# Patient Record
Sex: MALE | Race: WHITE | NOT HISPANIC OR LATINO | Employment: OTHER | URBAN - METROPOLITAN AREA
[De-identification: names, ages, dates, MRNs, and addresses within clinical notes are randomized per-mention and may not be internally consistent; named-entity substitution may affect disease eponyms.]

---

## 2018-04-14 ENCOUNTER — APPOINTMENT (OUTPATIENT)
Dept: CT IMAGING | Facility: CLINIC | Age: 78
End: 2018-04-14
Attending: EMERGENCY MEDICINE
Payer: COMMERCIAL

## 2018-04-14 ENCOUNTER — HOSPITAL ENCOUNTER (OUTPATIENT)
Facility: CLINIC | Age: 78
Setting detail: OBSERVATION
Discharge: HOME | End: 2018-04-15
Attending: EMERGENCY MEDICINE | Admitting: SURGERY
Payer: COMMERCIAL

## 2018-04-14 DIAGNOSIS — R10.12 LUQ ABDOMINAL PAIN: ICD-10-CM

## 2018-04-14 DIAGNOSIS — S22.42XA CLOSED FRACTURE OF MULTIPLE RIBS OF LEFT SIDE, INITIAL ENCOUNTER: ICD-10-CM
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
## 2018-04-14 PROBLEM — S22.49XA FRACTURE, RIBS: Status: ACTIVE | Noted: 2018-04-14

## 2018-04-14 LAB
ALBUMIN SERPL-MCNC: 3.8 G/DL (ref 3.4–5)
ALBUMIN UR-MCNC: NEGATIVE MG/DL
ALP SERPL-CCNC: 91 U/L (ref 40–150)
ALT SERPL W P-5'-P-CCNC: 26 U/L (ref 0–70)
ANION GAP SERPL CALCULATED.3IONS-SCNC: 5 MMOL/L (ref 3–14)
APPEARANCE UR: CLEAR
APTT PPP: 30 SEC (ref 22–37)
AST SERPL W P-5'-P-CCNC: 23 U/L (ref 0–45)
BACTERIA #/AREA URNS HPF: ABNORMAL /HPF
BASOPHILS # BLD AUTO: 0.1 10E9/L (ref 0–0.2)
BASOPHILS NFR BLD AUTO: 0.5 %
BILIRUB SERPL-MCNC: 0.1 MG/DL (ref 0.2–1.3)
BILIRUB UR QL STRIP: NEGATIVE
BUN SERPL-MCNC: 18 MG/DL (ref 7–30)
CALCIUM SERPL-MCNC: 8.9 MG/DL (ref 8.5–10.1)
CHLORIDE SERPL-SCNC: 106 MMOL/L (ref 94–109)
CO2 SERPL-SCNC: 27 MMOL/L (ref 20–32)
COLOR UR AUTO: YELLOW
CREAT BLD-MCNC: 1 MG/DL (ref 0.66–1.25)
CREAT SERPL-MCNC: 0.93 MG/DL (ref 0.66–1.25)
DIFFERENTIAL METHOD BLD: ABNORMAL
EOSINOPHIL # BLD AUTO: 0.2 10E9/L (ref 0–0.7)
EOSINOPHIL NFR BLD AUTO: 1.7 %
ERYTHROCYTE [DISTWIDTH] IN BLOOD BY AUTOMATED COUNT: 14.5 % (ref 10–15)
GFR SERPL CREATININE-BSD FRML MDRD: 72 ML/MIN/1.7M2
GFR SERPL CREATININE-BSD FRML MDRD: 78 ML/MIN/1.7M2
GLUCOSE SERPL-MCNC: 108 MG/DL (ref 70–99)
GLUCOSE UR STRIP-MCNC: NEGATIVE MG/DL
HCT VFR BLD AUTO: 47.9 % (ref 40–53)
HGB BLD-MCNC: 15.8 G/DL (ref 13.3–17.7)
HGB UR QL STRIP: NEGATIVE
IMM GRANULOCYTES # BLD: 0 10E9/L (ref 0–0.4)
IMM GRANULOCYTES NFR BLD: 0.3 %
INR PPP: 0.91 (ref 0.86–1.14)
KETONES UR STRIP-MCNC: NEGATIVE MG/DL
LEUKOCYTE ESTERASE UR QL STRIP: NEGATIVE
LIPASE SERPL-CCNC: 156 U/L (ref 73–393)
LYMPHOCYTES # BLD AUTO: 2 10E9/L (ref 0.8–5.3)
LYMPHOCYTES NFR BLD AUTO: 17.1 %
MCH RBC QN AUTO: 31.4 PG (ref 26.5–33)
MCHC RBC AUTO-ENTMCNC: 33 G/DL (ref 31.5–36.5)
MCV RBC AUTO: 95 FL (ref 78–100)
MONOCYTES # BLD AUTO: 1 10E9/L (ref 0–1.3)
MONOCYTES NFR BLD AUTO: 8.5 %
NEUTROPHILS # BLD AUTO: 8.5 10E9/L (ref 1.6–8.3)
NEUTROPHILS NFR BLD AUTO: 71.9 %
NITRATE UR QL: NEGATIVE
NRBC # BLD AUTO: 0 10*3/UL
NRBC BLD AUTO-RTO: 0 /100
PH UR STRIP: 7 PH (ref 5–7)
PLATELET # BLD AUTO: 281 10E9/L (ref 150–450)
POTASSIUM SERPL-SCNC: 4.1 MMOL/L (ref 3.4–5.3)
PROT SERPL-MCNC: 7.4 G/DL (ref 6.8–8.8)
RBC # BLD AUTO: 5.03 10E12/L (ref 4.4–5.9)
RBC #/AREA URNS AUTO: 0 /HPF (ref 0–2)
SODIUM SERPL-SCNC: 138 MMOL/L (ref 133–144)
SOURCE: ABNORMAL
SP GR UR STRIP: 1.01 (ref 1–1.03)
UROBILINOGEN UR STRIP-MCNC: 0 MG/DL (ref 0–2)
WBC # BLD AUTO: 11.8 10E9/L (ref 4–11)
WBC #/AREA URNS AUTO: 7 /HPF (ref 0–5)

## 2018-04-14 PROCEDURE — 96374 THER/PROPH/DIAG INJ IV PUSH: CPT

## 2018-04-14 PROCEDURE — 85730 THROMBOPLASTIN TIME PARTIAL: CPT | Performed by: EMERGENCY MEDICINE

## 2018-04-14 PROCEDURE — 85610 PROTHROMBIN TIME: CPT | Performed by: EMERGENCY MEDICINE

## 2018-04-14 PROCEDURE — 25000128 H RX IP 250 OP 636: Performed by: EMERGENCY MEDICINE

## 2018-04-14 PROCEDURE — G0378 HOSPITAL OBSERVATION PER HR: HCPCS

## 2018-04-14 PROCEDURE — 80053 COMPREHEN METABOLIC PANEL: CPT | Performed by: EMERGENCY MEDICINE

## 2018-04-14 PROCEDURE — 83690 ASSAY OF LIPASE: CPT | Performed by: EMERGENCY MEDICINE

## 2018-04-14 PROCEDURE — 99203 OFFICE O/P NEW LOW 30 MIN: CPT | Performed by: SURGERY

## 2018-04-14 PROCEDURE — 96361 HYDRATE IV INFUSION ADD-ON: CPT

## 2018-04-14 PROCEDURE — 71260 CT THORAX DX C+: CPT

## 2018-04-14 PROCEDURE — 25000128 H RX IP 250 OP 636: Performed by: SURGERY

## 2018-04-14 PROCEDURE — 25000132 ZZH RX MED GY IP 250 OP 250 PS 637: Performed by: SURGERY

## 2018-04-14 PROCEDURE — 99285 EMERGENCY DEPT VISIT HI MDM: CPT | Mod: 25

## 2018-04-14 PROCEDURE — 85025 COMPLETE CBC W/AUTO DIFF WBC: CPT | Performed by: EMERGENCY MEDICINE

## 2018-04-14 PROCEDURE — 81001 URINALYSIS AUTO W/SCOPE: CPT | Performed by: EMERGENCY MEDICINE

## 2018-04-14 PROCEDURE — 96375 TX/PRO/DX INJ NEW DRUG ADDON: CPT

## 2018-04-14 PROCEDURE — 82565 ASSAY OF CREATININE: CPT | Mod: 91

## 2018-04-14 RX ORDER — SODIUM CHLORIDE 9 MG/ML
1000 INJECTION, SOLUTION INTRAVENOUS CONTINUOUS
Status: DISCONTINUED | OUTPATIENT
Start: 2018-04-14 | End: 2018-04-14 | Stop reason: CLARIF

## 2018-04-14 RX ORDER — POLYETHYLENE GLYCOL 3350 17 G/17G
17 POWDER, FOR SOLUTION ORAL DAILY PRN
Status: DISCONTINUED | OUTPATIENT
Start: 2018-04-14 | End: 2018-04-15 | Stop reason: HOSPADM

## 2018-04-14 RX ORDER — OXYCODONE HYDROCHLORIDE 5 MG/1
5 TABLET ORAL
Status: DISCONTINUED | OUTPATIENT
Start: 2018-04-14 | End: 2018-04-15 | Stop reason: HOSPADM

## 2018-04-14 RX ORDER — ONDANSETRON 2 MG/ML
4 INJECTION INTRAMUSCULAR; INTRAVENOUS EVERY 30 MIN PRN
Status: DISCONTINUED | OUTPATIENT
Start: 2018-04-14 | End: 2018-04-14 | Stop reason: CLARIF

## 2018-04-14 RX ORDER — IBUPROFEN 600 MG/1
600 TABLET, FILM COATED ORAL EVERY 6 HOURS PRN
Status: DISCONTINUED | OUTPATIENT
Start: 2018-04-14 | End: 2018-04-15 | Stop reason: HOSPADM

## 2018-04-14 RX ORDER — ONDANSETRON 4 MG/1
4 TABLET, ORALLY DISINTEGRATING ORAL EVERY 6 HOURS PRN
Status: DISCONTINUED | OUTPATIENT
Start: 2018-04-14 | End: 2018-04-15 | Stop reason: HOSPADM

## 2018-04-14 RX ORDER — IOPAMIDOL 755 MG/ML
500 INJECTION, SOLUTION INTRAVASCULAR ONCE
Status: COMPLETED | OUTPATIENT
Start: 2018-04-14 | End: 2018-04-14

## 2018-04-14 RX ORDER — SODIUM CHLORIDE, SODIUM LACTATE, POTASSIUM CHLORIDE, CALCIUM CHLORIDE 600; 310; 30; 20 MG/100ML; MG/100ML; MG/100ML; MG/100ML
1000 INJECTION, SOLUTION INTRAVENOUS CONTINUOUS
Status: DISCONTINUED | OUTPATIENT
Start: 2018-04-14 | End: 2018-04-15 | Stop reason: HOSPADM

## 2018-04-14 RX ORDER — NALOXONE HYDROCHLORIDE 0.4 MG/ML
.1-.4 INJECTION, SOLUTION INTRAMUSCULAR; INTRAVENOUS; SUBCUTANEOUS
Status: DISCONTINUED | OUTPATIENT
Start: 2018-04-14 | End: 2018-04-15 | Stop reason: HOSPADM

## 2018-04-14 RX ORDER — MORPHINE SULFATE 4 MG/ML
4 INJECTION, SOLUTION INTRAMUSCULAR; INTRAVENOUS
Status: DISCONTINUED | OUTPATIENT
Start: 2018-04-14 | End: 2018-04-14

## 2018-04-14 RX ORDER — ONDANSETRON 2 MG/ML
4 INJECTION INTRAMUSCULAR; INTRAVENOUS EVERY 6 HOURS PRN
Status: DISCONTINUED | OUTPATIENT
Start: 2018-04-14 | End: 2018-04-15 | Stop reason: HOSPADM

## 2018-04-14 RX ORDER — LIDOCAINE 4 G/G
1 PATCH TOPICAL
Status: DISCONTINUED | OUTPATIENT
Start: 2018-04-14 | End: 2018-04-14

## 2018-04-14 RX ORDER — LIDOCAINE 40 MG/G
CREAM TOPICAL
Status: DISCONTINUED | OUTPATIENT
Start: 2018-04-14 | End: 2018-04-15 | Stop reason: HOSPADM

## 2018-04-14 RX ORDER — HYDROMORPHONE HYDROCHLORIDE 1 MG/ML
.3-.5 INJECTION, SOLUTION INTRAMUSCULAR; INTRAVENOUS; SUBCUTANEOUS
Status: DISCONTINUED | OUTPATIENT
Start: 2018-04-14 | End: 2018-04-15 | Stop reason: HOSPADM

## 2018-04-14 RX ADMIN — OXYCODONE HYDROCHLORIDE 5 MG: 5 TABLET ORAL at 17:20

## 2018-04-14 RX ADMIN — IBUPROFEN 600 MG: 600 TABLET ORAL at 08:06

## 2018-04-14 RX ADMIN — SODIUM CHLORIDE 60 ML: 9 INJECTION, SOLUTION INTRAVENOUS at 01:45

## 2018-04-14 RX ADMIN — ONDANSETRON 4 MG: 2 INJECTION INTRAMUSCULAR; INTRAVENOUS at 01:28

## 2018-04-14 RX ADMIN — SODIUM CHLORIDE, POTASSIUM CHLORIDE, SODIUM LACTATE AND CALCIUM CHLORIDE 1000 ML: 600; 310; 30; 20 INJECTION, SOLUTION INTRAVENOUS at 05:49

## 2018-04-14 RX ADMIN — HYDROMORPHONE HYDROCHLORIDE 0.5 MG: 1 INJECTION, SOLUTION INTRAMUSCULAR; INTRAVENOUS; SUBCUTANEOUS at 05:52

## 2018-04-14 RX ADMIN — IOPAMIDOL 81 ML: 755 INJECTION, SOLUTION INTRAVENOUS at 01:45

## 2018-04-14 RX ADMIN — OXYCODONE HYDROCHLORIDE 5 MG: 5 TABLET ORAL at 08:06

## 2018-04-14 RX ADMIN — IBUPROFEN 600 MG: 600 TABLET ORAL at 18:11

## 2018-04-14 RX ADMIN — SODIUM CHLORIDE 1000 ML: 9 INJECTION, SOLUTION INTRAVENOUS at 01:25

## 2018-04-14 RX ADMIN — MORPHINE SULFATE 4 MG: 4 INJECTION INTRAVENOUS at 03:01

## 2018-04-14 RX ADMIN — LIDOCAINE 1 PATCH: 560 PATCH PERCUTANEOUS; TOPICAL; TRANSDERMAL at 08:01

## 2018-04-14 RX ADMIN — OXYCODONE HYDROCHLORIDE 5 MG: 5 TABLET ORAL at 12:15

## 2018-04-14 RX ADMIN — POLYETHYLENE GLYCOL 3350 17 G: 17 POWDER, FOR SOLUTION ORAL at 18:12

## 2018-04-14 RX ADMIN — SODIUM CHLORIDE 1000 ML: 9 INJECTION, SOLUTION INTRAVENOUS at 02:57

## 2018-04-14 ASSESSMENT — ENCOUNTER SYMPTOMS
HEADACHES: 0
ARTHRALGIAS: 0
ABDOMINAL PAIN: 1
WOUND: 0
JOINT SWELLING: 0
COUGH: 0
VOMITING: 0

## 2018-04-14 NOTE — PLAN OF CARE
Problem: Patient Care Overview  Goal: Plan of Care/Patient Progress Review  PRIMARY DIAGNOSIS: Fall   OUTPATIENT/OBSERVATION GOALS TO BE MET BEFORE DISCHARGE:  1. ADLs back to baseline: Yes    2. Activity and level of assistance: Up with standby assistance.    3. Pain status: IV narcotics    4. Return to near baseline physical activity: No     Discharge Planner Nurse   Safe discharge environment identified: Yes  Barriers to discharge: No       Entered by: Felisa Younger 04/14/2018 3:57 AM     Please review provider order for any additional goals.   Nurse to notify provider when observation goals have been met and patient is ready for discharge.    Pt arrived to floor at 0330. A/O x4, c/o 6/10 pain to left side, no bruising present. Pt stated that he fell during work on Thursday afternoon and could not take the pain anymore and decided to come in. CT shows 10-11 rib fracture, UA neg. Will continue to monitor.

## 2018-04-14 NOTE — PLAN OF CARE
Problem: Patient Care Overview  Goal: Plan of Care/Patient Progress Review  ROOM # 221    Living Situation (if not independent, order SW consult): Independent  Facility name:  : Mendel Mcdaniel (son) [phone]    Activity level at baseline: Ind   Activity level on admit: SBA      Patient registered to observation; given Patient Bill of Rights; given the opportunity to ask questions about observation status and their plan of care.  Patient has been oriented to the observation room, bathroom and call light is in place.    Discussed discharge goals and expectations with patient/family.

## 2018-04-14 NOTE — H&P
"[facility]     Shivam Mcdaniel MRN# [id number]   Age: 77 year old YOB: 1940     Date of Admission:  4/14/2018                                Assessment and Plan:   Assessment:   -  Patient Active Problem List    Diagnosis Date Noted     Fracture, ribs 04/14/2018     Priority: Medium         Plan:   Pain management, transition to oral when able  No surgery indicated  Bowel management  DVT prophylaxis            Chief Complaint:   Fall, rib fractures     History is obtained from the patient and electronic health record         History of Present Illness:   This patient is a 77 year old  male without a significant past medical history who presents with the following condition requiring a hospital admission: fall with rib fractures. He was clearing property when he slipped and fell onto a 4\" diameter stump 4 PM two days ago. Since then he has had persistent pain and came to the ER for this. He denies any other injury or symptoms.           Past Medical History:   History reviewed. No pertinent past medical history.          Past Surgical History:   History reviewed. No pertinent surgical history.          Social History:     Social History   Substance Use Topics     Smoking status: Current Every Day Smoker     Smokeless tobacco: Never Used     Alcohol use Yes             Family History:   No family history on file.          Immunizations:     VACCINE/DOSE   Diptheria   DPT   DTAP   HBIG   Hepatitis A   Hepatitis B   HIB   Influenza   Measles   Meningococcal   MMR   Mumps   Pneumococcal   Polio   Rubella   Small Pox   TDAP   Varicella   Zoster             Allergies:   No Known Allergies          Medications:     Current Facility-Administered Medications   Medication     sodium chloride 0.9% infusion     ondansetron (ZOFRAN) injection 4 mg     naloxone (NARCAN) injection 0.1-0.4 mg     lidocaine 1 % 1 mL     lidocaine (LMX4) kit     sodium chloride (PF) 0.9% PF flush 3 mL     " "lactated ringers infusion     ibuprofen (ADVIL/MOTRIN) tablet 600 mg     Lidocaine (LIDOCARE) 4 % Patch 1 patch     lidocaine patch REMOVAL     lidocaine patch in PLACE     HYDROmorphone (PF) (DILAUDID) injection 0.3-0.5 mg     oxyCODONE IR (ROXICODONE) tablet 5 mg     polyethylene glycol (MIRALAX/GLYCOLAX) Packet 17 g     ondansetron (ZOFRAN-ODT) ODT tab 4 mg    Or     ondansetron (ZOFRAN) injection 4 mg             Review of Systems:   C: NEGATIVE for fever, chills, change in weight  E/M: NEGATIVE for ear, mouth and throat problems  R: NEGATIVE for significant cough or SOB          Physical Exam:   All vitals have been reviewed  Patient Vitals for the past 24 hrs:   BP Temp Temp src Pulse Heart Rate Resp SpO2 Height Weight   04/14/18 0340 131/87 97.9  F (36.6  C) Oral - 74 20 97 % - -   04/14/18 0315 (!) 144/92 - - - - - 95 % - -   04/14/18 0300 133/73 - - - 66 - 96 % - -   04/14/18 0245 125/82 - - - 69 12 96 % - -   04/14/18 0230 133/71 - - - - 13 97 % - -   04/14/18 0215 128/72 - - - 67 8 97 % - -   04/14/18 0200 125/75 - - - - - - - -   04/14/18 0134 127/74 - - - 76 (!) 46 90 % - -   04/14/18 0130 - - - - 75 (!) 82 93 % - -   04/14/18 0128 - - - - 73 30 93 % - -   04/14/18 0124 - - - - 73 25 94 % - -   04/14/18 0117 146/85 98.9  F (37.2  C) Oral - - 22 - 1.702 m (5' 7\") -   04/14/18 0114 146/85 - - 78 78 18 95 % - 72.6 kg (160 lb)     No intake or output data in the 24 hours ending 04/14/18 0754  HEENT AT/NC, GCS - 15 E-4  V-5  M-6, full ROM without pain, nl occlusion per pt, no nasal trauma apparent, voice normal per pt  Neck:   supple, symmetrical, trachea midline, skin normal and no stridor     Chest / Breast:   Nl resp effort, pain in Left lower chest at injury site     Abdomen:   soft, non-distended, non-tender, voluntary guarding absent and no masses palpated     Musculoskeletal:   There is no redness, warmth, or swelling of the joints.  Full range of motion noted.  Motor strength is 5 out of 5 all " extremities bilaterally.  Tone is normal.             Data:   All laboratory data reviewed  Results for orders placed or performed during the hospital encounter of 04/14/18   CT Chest/Abdomen/Pelvis w Contrast    Narrative    CT CHEST/ABDOMEN/PELVIS W CONTRAST  4/14/2018 1:50 AM    HISTORY: One day status post falling on 4 inch tree stump. Evaluate  trauma.    TECHNIQUE: CT scan obtained of the chest, abdomen, and pelvis with  oral and IV contrast. 81 mL Isovue-370 injected. Radiation dose for  this scan was reduced using automated exposure control, adjustment of  the mA and/or kV according to patient size, or iterative  reconstruction technique.    COMPARISON: None.    FINDINGS:  Chest: There is dependent atelectasis bilaterally. No pneumothorax or  pleural effusion. The thoracic aorta is calcified and tortuous. There  are coronary artery atherosclerotic calcifications. The heart size is  normal. No mediastinal, hilar or axillary lymph node enlargement.    Abdomen: There are a few hepatic cysts and other tiny low attenuation  lesions. Low attenuation subcentimeter liver lesion(s) compatible with  benign cysts or other benign lesions. No specific evaluation or  follow-up is recommended in a low risk patient. The spleen,  gallbladder, pancreas, adrenal glands and left kidney are normal in  appearance. A few tiny right renal cortical lesions. 1.8 cm cyst in  the upper pole of the right kidney. Low attenuation subcentimeter  renal lesion(s). These are compatible with small benign cysts and no  specific imaging evaluation or follow-up is recommended. There is no  abdominal or pelvic lymph node enlargement. There is atherosclerotic  calcification of the aorta and its branches. No aneurysm.    Pelvis: There are sigmoid diverticula without acute diverticulitis. No  bowel obstruction or inflammation. The prostate gland is enlarged and  there is a TURP defect. There are postoperative changes in the  anterior pelvic wall.  There are nondisplaced fractures of the left  lateral tenth and eleventh ribs. No other acute bone fracture.      Impression    IMPRESSION:  1. Left lateral tenth and eleventh rib fractures.  2. No other acute traumatic abnormality.  3. Coronary artery atherosclerotic calcifications.  4. Colonic diverticula without acute diverticulitis.    RAJWINDER MADRIGAL MD   CBC with platelets differential   Result Value Ref Range    WBC 11.8 (H) 4.0 - 11.0 10e9/L    RBC Count 5.03 4.4 - 5.9 10e12/L    Hemoglobin 15.8 13.3 - 17.7 g/dL    Hematocrit 47.9 40.0 - 53.0 %    MCV 95 78 - 100 fl    MCH 31.4 26.5 - 33.0 pg    MCHC 33.0 31.5 - 36.5 g/dL    RDW 14.5 10.0 - 15.0 %    Platelet Count 281 150 - 450 10e9/L    Diff Method Automated Method     % Neutrophils 71.9 %    % Lymphocytes 17.1 %    % Monocytes 8.5 %    % Eosinophils 1.7 %    % Basophils 0.5 %    % Immature Granulocytes 0.3 %    Nucleated RBCs 0 0 /100    Absolute Neutrophil 8.5 (H) 1.6 - 8.3 10e9/L    Absolute Lymphocytes 2.0 0.8 - 5.3 10e9/L    Absolute Monocytes 1.0 0.0 - 1.3 10e9/L    Absolute Eosinophils 0.2 0.0 - 0.7 10e9/L    Absolute Basophils 0.1 0.0 - 0.2 10e9/L    Abs Immature Granulocytes 0.0 0 - 0.4 10e9/L    Absolute Nucleated RBC 0.0    INR   Result Value Ref Range    INR 0.91 0.86 - 1.14   Partial thromboplastin time   Result Value Ref Range    PTT 30 22 - 37 sec   Comprehensive metabolic panel   Result Value Ref Range    Sodium 138 133 - 144 mmol/L    Potassium 4.1 3.4 - 5.3 mmol/L    Chloride 106 94 - 109 mmol/L    Carbon Dioxide 27 20 - 32 mmol/L    Anion Gap 5 3 - 14 mmol/L    Glucose 108 (H) 70 - 99 mg/dL    Urea Nitrogen 18 7 - 30 mg/dL    Creatinine 0.93 0.66 - 1.25 mg/dL    GFR Estimate 78 >60 mL/min/1.7m2    GFR Estimate If Black >90 >60 mL/min/1.7m2    Calcium 8.9 8.5 - 10.1 mg/dL    Bilirubin Total 0.1 (L) 0.2 - 1.3 mg/dL    Albumin 3.8 3.4 - 5.0 g/dL    Protein Total 7.4 6.8 - 8.8 g/dL    Alkaline Phosphatase 91 40 - 150 U/L    ALT 26 0 - 70 U/L     AST 23 0 - 45 U/L   Lipase   Result Value Ref Range    Lipase 156 73 - 393 U/L   UA with Microscopic   Result Value Ref Range    Color Urine Yellow     Appearance Urine Clear     Glucose Urine Negative NEG^Negative mg/dL    Bilirubin Urine Negative NEG^Negative    Ketones Urine Negative NEG^Negative mg/dL    Specific Gravity Urine 1.010 1.003 - 1.035    Blood Urine Negative NEG^Negative    pH Urine 7.0 5.0 - 7.0 pH    Protein Albumin Urine Negative NEG^Negative mg/dL    Urobilinogen mg/dL 0.0 0.0 - 2.0 mg/dL    Nitrite Urine Negative NEG^Negative    Leukocyte Esterase Urine Negative NEG^Negative    Source Midstream Urine     WBC Urine 7 (H) 0 - 5 /HPF    RBC Urine 0 0 - 2 /HPF    Bacteria Urine Few (A) NEG^Negative /HPF   Creatinine POCT   Result Value Ref Range    Creatinine 1.0 0.66 - 1.25 mg/dL    GFR Estimate 72 >60 mL/min/1.7m2    GFR Estimate If Black 88 >60 mL/min/1.7m2     -     Attestation:  I have reviewed today's vital signs, notes, medications, labs and imaging.  Amount of time performed on this consult: 45 minutes.    Joce Langston MD

## 2018-04-14 NOTE — ED NOTES
Patient has no obvious sign of bruising on Left abdomen/rib/side where patient came into contact with tree stump. Unable to take deep breath without 10/10 pain, tender to the slightest touch

## 2018-04-14 NOTE — ED NOTES
"[facility]  ED Nurse Handoff Report    Shivam Mcdaniel is a 77 year old male   ED Chief complaint: Fall  . ED Diagnosis:   Final diagnoses:   Fall, initial encounter   LUQ abdominal pain   Closed fracture of multiple ribs of left side, initial encounter     Allergies: No Known Allergies    Code Status: Full Code  Activity level - Baseline/Home:  Independent. Activity Level - Current:   Stand with Assist. Lift room needed: No. Bariatric: No   Needed: No   Isolation: No. Infection: Not Applicable.     Vital Signs:   Vitals:    04/14/18 0200 04/14/18 0215 04/14/18 0230 04/14/18 0245   BP: 125/75 128/72 133/71 125/82   Pulse:       Resp:  8 13 12   Temp:       TempSrc:       SpO2:  97% 97% 96%   Weight:       Height:           Cardiac Rhythm:  ,      Pain level: 0-10 Pain Scale: 6  Patient confused: No. Patient Falls Risk: Yes.   Elimination Status: Has voided   Patient Report - Initial Complaint: Fall with Left abdomen/rib pain after landing on a 4\"diameter tree stump Thursday. Focused Assessment: Left lung diminished but clear. Severe Left rib/Upper abdomen pain with light tough or movement   Tests Performed: Labs, CT. Abnormal Results: See results, Left 10/11th rib fracture.   Treatments provided: IV, PAIN meds  Family Comments: Son present and agreeable to admit  OBS brochure/video discussed/provided to patient:  Yes  ED Medications:   Medications   0.9% sodium chloride BOLUS (0 mLs Intravenous Stopped 4/14/18 0257)     Followed by   sodium chloride 0.9% infusion (1,000 mLs Intravenous New Bag 4/14/18 0257)   morphine (PF) injection 4 mg (4 mg Intravenous Given 4/14/18 0301)   ondansetron (ZOFRAN) injection 4 mg (4 mg Intravenous Given 4/14/18 0128)   0.9% sodium chloride BOLUS (0 mLs Intravenous Stopped 4/14/18 0148)   iopamidol (ISOVUE-370) solution 500 mL (81 mLs Intravenous Given 4/14/18 0145)     Drips infusing:  No  For the majority of the shift, the patient's behavior Green. " Interventions performed were N/A.     Severe Sepsis OR Septic Shock Diagnosis Present: No      ED Nurse Name/Phone Number: Kylie Yap,   3:06 AM    RECEIVING UNIT ED HANDOFF REVIEW    Above ED Nurse Handoff Report was reviewed: Yes  Reviewed by: Felisa Younger on April 14, 2018 at 3:11 AM

## 2018-04-14 NOTE — PLAN OF CARE
Problem: Patient Care Overview  Goal: Plan of Care/Patient Progress Review  PRIMARY DIAGNOSIS: ACUTE PAIN/RIB FRACTURE  OUTPATIENT/OBSERVATION GOALS TO BE MET BEFORE DISCHARGE:  1. Pain Status: Improved-controlled with oral pain medications.    2. Return to near baseline physical activity: Yes    3. Cleared for discharge by consultants (if involved): N/A    Discharge Planner Nurse   Safe discharge environment identified: Yes  Barriers to discharge: No       Entered by: Leonor Man 04/14/2018 5:14 PM     Please review provider order for any additional goals.   Nurse to notify provider when observation goals have been met and patient is ready for discharge.

## 2018-04-14 NOTE — ED NOTES
Pt reports falling onto 4 inch diameter stump and striking right abd Thursday at 4 pm. Continued pain denies any bleeding, no noted deformities or bruising

## 2018-04-14 NOTE — PHARMACY-ADMISSION MEDICATION HISTORY
Admission medication history interview status for this patient is complete. See [facility] admission navigator for allergy information, prior to admission medications and immunization status.     Medication history interview source(s):Patient  Medication history resources (including written lists, pill bottles, clinic record):None  Primary pharmacy[facility]    Changes made to PTA medication list:  Added: none  Deleted: none  Changed: none    Actions taken by pharmacist (provider contacted, etc):None     Additional medication history information:None    Medication reconciliation/reorder completed by provider prior to medication history? No    Do you take OTC medications (eg tylenol, ibuprofen, fish oil, eye/ear drops, etc)? N      Prior to Admission medications    Not on File

## 2018-04-14 NOTE — IP AVS SNAPSHOT
[facility] Observation Department    [address]    [facility] [address]    Phone:  [phone]                                       After Visit Summary   4/14/2018    Shivam Mcdaniel    MRN: [id number]           After Visit Summary Signature Page     I have received my discharge instructions, and my questions have been answered. I have discussed any challenges I see with this plan with the nurse or doctor.    ..........................................................................................................................................  Patient/Patient Representative Signature      ..........................................................................................................................................  Patient Representative Print Name and Relationship to Patient    ..................................................               ................................................  Date                                            Time    ..........................................................................................................................................  Reviewed by Signature/Title    ...................................................              ..............................................  Date                                                            Time

## 2018-04-14 NOTE — IP AVS SNAPSHOT
MRN:[id number]                      After Visit Summary   4/14/2018    Shivam Mcdaniel    MRN: [id number]           Thank you!     Thank you for choosing [facility] for your care. Our goal is always to provide you with excellent care. Hearing back from our patients is one way we can continue to improve our services. Please take a few minutes to complete the written survey that you may receive in the mail after you visit. If you would like to speak to someone directly about your visit please contact Patient Relations at [phone]. Thank you!          Patient Information     Date Of Birth          1940        About your hospital stay     You were admitted on:  April 14, 2018 You last received care in the:  [facility] Observation Department    You were discharged on:  April 15, 2018       Who to Call     For medical emergencies, please call 911.  For non-urgent questions about your medical care, please call your primary care provider or clinic, None          Attending Provider     Provider Specialty    Zachary Cruz MD Emergency Medicine    Yuridia Avina MD General Surgery       Primary Care Provider Fax #    Provider Not In System [phone]                 Further instructions from your care team       HOME CARE   BATSHEVA Rasmussen, NATHANIEL Avina, JOHN Langston, RDaniele Shaw    DIET:  No restrictions.  Increased fluid intake is recommended. While taking pain medications, increase dietary fiber or add a fiber supplementation like Metamucil or Citrucel to help prevent constipation - a possible side effect of pain medications.    NAUSEA:  If nauseated from the pain meds; rest in bed, get up cautiously with assistance, and drink clear liquids (juice, tea, broth).    ACTIVITY:  Light Activity -- you may immediately be up and about as tolerated.  Driving -- you may drive when comfortable and off narcotic pain medications.  Light Work -- resume  when comfortable off pain medications.  (If you can drive, you probably can work.)  Strenuous Work/Activity -- limit lifting to 20 pounds for 3 weeks.  Active Sports (running, biking, etc.) -- cautiously resume after 4 weeks.      DISCOMFORT:   Take the pain medication with some food, when possible, to minimize side effects.  Intermittent use of ice packs may help during the first 1-3 weeks.  Expect gradual improvement.    Over-the-counter anti-inflammatory medications (i.e. Ibuprofen/Advil/Motrin or Naprosyn/Aleve) may be used per package instructions in addition to or while tapering off the narcotic pain medications to decrease swelling and sensitivity at the repair site.  DO NOT TAKE these Anti-inflammatory medications if your primary physician has advised against doing so, or if you have acid reflux, ulcer, or bleeding disorder, or take blood-thinner medications.  Call your primary physician or the surgery office if you have medication questions.      FREQUENTLY ASKED QUESTIONS:    Q:  There is a piece of tape or a sticky  lead  still on my skin.  Can I remove this?  A:  Sometimes the sticky  leads  used for monitoring during surgery or for evaluation in the emergency department are not all removed while you are in the hospital.  These sometimes have a tab or metal dot on them.  You can easily remove these on your own, like taking off a band-aid.  If there is a gel substance under the  lead , simply wipe/clean it off with a washcloth or paper towel.      Q:  What can I do to minimize constipation (very hard stools, or lack of stools)?  A:  Stay well hydrated.  Increase your dietary fiber intake or take a fiber supplement -with plenty of water.  Walk around frequently.  You may consider an over-the-counter stool-softener.  Your Pharmacist can assist you with choosing one that is stocked at your pharmacy.  Constipation is also one of the most common side effects of pain medication.  If you are using pain  medication, be pro-active and try to PREVENT problems with constipation by taking the steps above BEFORE constipation becomes a problem.    Q:  What do I do if I need more pain medications?  A:  Call the office to receive refills.  Be aware that certain pain meds cannot be called into a pharmacy and actually require a paper prescription.  A change may be made in your pain med as you progress thru your recovery period or if you have side effects to certain meds.    --Pain meds are NOT refilled after 5pm on weekdays, and NOT AT ALL on the weekends, so please look ahead to prevent problems.      Q:  Why am I having a hard time sleeping now that I am at home?  A:  Many medications you receive while you are in the hospital can impact your sleep for a number of days after your surgery/hospitalization.  Decreased level of activity and naps during the day may also make sleeping at night difficult.  Try to minimize day-time naps, and get up frequently during the day to walk around your home during your recovery time.  Sleep aides may be of some help, but are not recommended for long-term use.      Q:  Why am I having headaches?  A:  Headaches can be caused by many things:  caffeine withdrawal, use of pain meds, dehydration, high blood pressure, lack of sleep, over-activity/exhaustion, flare-up of usual migraine headaches.  If you feel this is related to muscle tension (a band-like feeling around the head, or a pressure at the low-back of the head) you may try ice or heat to this area.  You may need to drink more fluids (try electrolyte drink like Gatorade), rest, or take your usual migraine medications.   **If your headaches do not resolve, worsen, are accompanied by other symptoms, or if your blood pressure is high, please call your primary physician for recommendation and/or examination    If you have other questions, please call the office Monday thru Friday between 8am and 5pm to discuss with the nurse or physician  "assistant.  #[phone]    There is a surgeon ON CALL on weekday evenings and over the weekend in case of urgent need only, and may be contacted at the same number.    If you are having an emergency, call 911 or proceed to your nearest emergency department.          Pending Results     No orders found for last 3 day(s).            Admission Information     Date & Time Provider Department Dept. Phone    2018 Yuridia Avina MD [facility] Observ[facility] Department [phone]      Your Vitals Were     Blood Pressure Pulse Temperature Respirations Height Weight    132/69 (BP Location: Left arm) 71 97.6  F (36.4  C) (Oral) 16 1.702 m (5' 7\") 72.6 kg (160 lb)    Pulse Oximetry BMI (Body Mass Index)                95% 25.06 kg/m2          MyCharSnipSnap Information     HelloNature lets you send messages to your doctor, view your test results, renew your prescriptions, schedule appointments and more. To sign up, go to [email or web address] . Click on \"Log in\" on the left side of the screen, which will take you to the Welcome page. Then click on \"Sign up Now\" on the right side of the page.     You will be asked to enter the access code listed below, as well as some personal information. Please follow the directions to create your username and password.     Your access code is: [email or web address]  Expires: 2018  7:29 AM     Your access code will  in 90 days. If you need help or a new code, please call your [place] clinic or [phone].        Care EveryWhere ID     This is your Care EveryWhere ID. This could be used by other organizations to access your [place] medical records  [id number]        Equal Access to Services     LOIDA GAYLE : Cami Morgan, tan kelly, cara yao, michael marx. So [facility] [phone].    ATENCIÓN: Si habla español, tiene a mcgarry disposición servicios gratuitos de asistencia lingüística. Llame al " [phone].    We comply with applicable federal civil rights laws and [place] laws. We do not discriminate on the basis of race, color, national origin, age, disability, sex, sexual orientation, or gender identity.               Review of your medicines      START taking        Dose / Directions    oxyCODONE IR 5 MG tablet   Commonly known as:  ROXICODONE   Used for:  Fall, initial encounter        Dose:  5 mg   Take 1 tablet (5 mg) by mouth every 3 hours as needed for moderate to severe pain   Quantity:  30 tablet   Refills:  0            Where to get your medicines      Some of these will need a paper prescription and others can be bought over the counter. Ask your nurse if you have questions.     Bring a paper prescription for each of these medications     oxyCODONE IR 5 MG tablet                Protect others around you: Learn how to safely use, store and throw away your medicines at www.disposemymeds.org.        Information about OPIOIDS     PRESCRIPTION OPIOIDS: WHAT YOU NEED TO KNOW    Prescription opioids can be used to help relieve moderate to severe pain and are often prescribed following a surgery or injury, or for certain health conditions. These medications can be an important part of treatment but also come with serious risks. It is important to work with your health care provider to make sure you are getting the safest, most effective care.    WHAT ARE THE RISKS AND SIDE EFFECTS OF OPIOID USE?  Prescription opioids carry serious risks of addiction and overdose, especially with prolonged use. An opioid overdose, often marked by slowed breathing can cause sudden death. The use of prescription opioids can have a number of side effects as well, even when taken as directed:      Tolerance - meaning you might need to take more of a medication for the same pain relief    Physical dependence - meaning you have symptoms of withdrawal when a medication is stopped    Increased sensitivity to  pain    Constipation    Nausea, vomiting, and dry mouth    Sleepiness and dizziness    Confusion    Depression    Low levels of testosterone that can result in lower sex drive, energy, and strength    Itching and sweating    RISKS ARE GREATER WITH:    History of drug misuse, substance use disorder, or overdose    Mental health conditions (such as depression or anxiety)    Sleep apnea    Older age (65 years or older)    Pregnancy    Avoid alcohol while taking prescription opioids.   Also, unless specifically advised by your health care provider, medications to avoid include:    Benzodiazepines (such as Xanax or Valium)    Muscle relaxants (such as Soma or Flexeril)    Hypnotics (such as Ambien or Lunesta)    Other prescription opioids    KNOW YOUR OPTIONS:  Talk to your health care provider about ways to manage your pain that do not involve prescription opioids. Some of these options may actually work better and have fewer risks and side effects:    Pain relievers such as acetaminophen, ibuprofen, and naproxen    Some medications that are also used for depression or seizures    Physical therapy and exercise    Cognitive behavioral therapy, a psychological, goal-directed approach, in which patients learn how to modify physical, behavioral, and emotional triggers of pain and stress    IF YOU ARE PRESCRIBED OPIOIDS FOR PAIN:    Never take opioids in greater amounts or more often than prescribed    Follow up with your primary health care provider and work together to create a plan on how to manage your pain.    Talk about ways to help manage your pain that do not involve prescription opioids    Talk about all concerns and side effects    Help prevent misuse and abuse    Never sell or share prescription opioids    Never use another person's prescription opioids    Store prescription opioids in a secure place and out of reach of others (this may include visitors, children, friends, and family)    Visit  www.cdc.gov/drugoverdose to learn about risks of opioid abuse and overdose    If you believe you may be struggling with addiction, tell your health care provider and ask for guidance or call [facility]'s National Helpline at 5-363-991-HELP    LEARN MORE / www.cdc.gov/drugoverdose/prescribing/guideline.html    Safely dispose of unused prescription opioids: Find your local drug take-back programs and more information about the importance of safe disposal at www.doseofreality.[place].gov             Medication List: This is a list of all your medications and when to take them. Check marks below indicate your daily home schedule. Keep this list as a reference.      Medications           Morning Afternoon Evening Bedtime As Needed    oxyCODONE IR 5 MG tablet   Commonly known as:  ROXICODONE   Take 1 tablet (5 mg) by mouth every 3 hours as needed for moderate to severe pain   Last time this was given:  5 mg on 4/15/2018  8:10 AM

## 2018-04-14 NOTE — PLAN OF CARE
Problem: Patient Care Overview  Goal: Plan of Care/Patient Progress Review  Outcome: Improving  PRIMARY DIAGNOSIS: ACUTE PAIN  OUTPATIENT/OBSERVATION GOALS TO BE MET BEFORE DISCHARGE:  1. Pain Status: Improved-controlled with oral pain medications.    2. Return to near baseline physical activity: Yes    3. Cleared for discharge by consultants (if involved): N/A    Discharge Planner Nurse   Safe discharge environment identified: Yes  Barriers to discharge: No       Entered by: Caden Lees 04/14/2018 1:46 PM      RN - VSS. Pt pain well controlled with oxycodone. Pt tolerating ambulating hallways with SBA. Using IS appropriately - noting small productive cough. Stating he feels uncomfortable discharging tonight however confident with progress with pain tolerance that discharging tomorrow would be better.    Please review provider order for any additional goals.   Nurse to notify provider when observation goals have been met and patient is ready for discharge.

## 2018-04-14 NOTE — ED PROVIDER NOTES
"  History     Chief Complaint:  Fall    HPI   Shivam Mcdaniel is a non-anticoagulated 77 year old male who presents to the emergency department today for evaluation of a fall. The patient reports two days ago at 4pm he was clearing property when he accidentally lost his balance and fell down hitting the left side of his abdomen on a four inch round stump. He was unable to get his arms out prior to falling. He did not hit his head or lost consciousness. Since this time, he has been constant left sided abdominal pain and \"screaming out\" in pain. He has been taking tylenol without significant relief. Therefore, prompting his visit to the ED for further evaluation. The patient denies any other injuries, extremity pain, hemoptysis, and hematemesis. Of note, the patient usually have a bowel movement daily, but did not have one yesterday.     Allergies:  No Known Drug Allergies     Medications:    The patient is currently on no regular medications.    Past Medical History:    History reviewed. No pertinent past medical history.    Past Surgical History:    History reviewed. No pertinent surgical history.    Family History:    History reviewed. No pertinent family history.     Social History:  The patient was accompanied to the ED by his son.  Smoking Status: Current everyday  Smokeless Tobacco: Never  Alcohol Use: Yes     Review of Systems   Respiratory: Negative for cough.    Gastrointestinal: Positive for abdominal pain (left). Negative for vomiting.   Musculoskeletal: Negative for arthralgias and joint swelling.   Skin: Negative for wound.   Neurological: Negative for syncope and headaches.   All other systems reviewed and are negative.    Physical Exam   First Vitals:  BP: 146/85  Pulse: 78  Heart Rate: 78  Temp: 98.9  F (37.2  C)  Resp: 18  Height: 170.2 cm (5' 7\")  Weight: 72.6 kg (160 lb)  SpO2: 95 %    Physical Exam  General: The patient is alert, in no respiratory distress. Crying out in pain.     HENT: Mucous " membranes moist.    Cardiovascular: Regular rate and rhythm. Good pulses in all four extremities. Normal capillary refill and skin turgor.     Respiratory: Lungs are clear. No nasal flaring. No retractions. No wheezing, no crackles.    Gastrointestinal: Abdomen soft. No guarding, no rebound. No palpable hernias. Exquisitely tender left upper and left middle quadrant of abdomen.      Musculoskeletal: No gross deformity.     Skin: No rashes or petechiae.     Neurologic: The patient is alert and oriented x3. GCS 15. No testable cranial nerve deficit. Follows commands with clear and appropriate speech. Gives appropriate answers. Good strength in all extremities. No gross neurologic deficit. Gross sensation intact. Pupils are round and reactive. No meningismus.     Lymphatic: No cervical adenopathy. No lower extremity swelling.    Psychiatric: The patient is non-tearful.    Emergency Department Course     Imaging:  Radiology findings were communicated with the patient and family who voiced understanding of the findings.  CT Chest/Abdomen/Pelvis w Contrast   IMPRESSION:  1. Left lateral tenth and eleventh rib fractures.  2. No other acute traumatic abnormality.  3. Coronary artery atherosclerotic calcifications.  4. Colonic diverticula without acute diverticulitis.  Report per radiology     Laboratory:  Laboratory findings were communicated with the patient and family who voiced understanding of the findings.  Creatinine POCT: Creatinine 1.0, GFR 72   INR: 0.91  PTT: 30  CMP: Glucose 108 (H), bilirubin total 0.1 (L) o/w WNL (Creatinine 0.93)  CBC: WBC 11.8 (H) o/w WNL. (HGB 15.8, )   Lipase: 156  UA: clear yellow urine, WBC 7 (H), few bacteria o/w WNL    Interventions:  0125 NS Bolus 1,000mL IV  0128 Zofran 4mg IV  0257 NS Infusion 125mL/hr  IV  0301 Morphine 4mg IV     Emergency Department Course:  Nursing notes and vitals reviewed.  The patient was sent for a CT Chest/Abdomen/Pelvis w Contrast while in the  emergency department, results above.   The patient provided a urine sample here in the emergency department. This was sent for laboratory testing, findings above.  IV was inserted and blood was drawn for laboratory testing, results above.  0112: I performed an exam of the patient as documented above.   0213: Patient rechecked. His pain is better and does not any further analgesics at this time.   0248: Patient rechecked and updated. Patient is on 1L oxygen.   0253: I spoke with Dr. Avina  of the Surgery service regarding patient's presentation, findings, and plan of care.  Findings and plan explained to the Patient and son who consents to admission. Discussed the patient with Dr. Avina, who will admit the patient to an obs bed for further monitoring, evaluation, and treatment.  I personally reviewed the laboratory and imaging results with the Patient and son and answered all related questions prior to admission.    Impression & Plan      Medical Decision Making:  The patient states a day and a half ago he was clearing some brush and fell on a fairly good sized tree stump. He says he did not hit his head and is not anticoagulated and denies any medical problems. Here, his blood pressure is adequate. He is tender over the abdomen. There is no signs of any neurologic deficit. I was however worried because his significant amounts of pain. I considered rib fracture, splenic injury, kidney laceration, liver laceration, bowel obstruction, amongst other causes. He was therefore sent for a CT scan of the chest, abdomen, and pelvis. I discussed the risks and benefits with him. He was treated with pain medication. After light pain medication, the patient required a 1L of oxygen due to hypoxia. I do not feel this is likely PE. His CT scan demonstrated only a left sided 10th and 11th rib fractures which were non-displaced. I felt that this risk of complications is high due to multiple left rib fractures. I discussed  the case with the on-call surgeon who accepted him for admission, mainly for pain control but also to observed to ensure he does not develop any other problems. He was admitted to observation status in good condition, but unable to roll over or move with pain.     Diagnosis:    ICD-10-CM    1. Fall, initial encounter W19.XXXA    2. LUQ abdominal pain R10.12    3. Closed fracture of multiple ribs of left side, initial encounter S22.42XA      Disposition:  Admitted to obs    Scribe Disclosure:  IAllie, am serving as a scribe at 1:09 AM on 4/14/2018 to document services personally performed by Zachary Cruz MD based on my observations and the provider's statements to me.        4/14/2018   [facility] EMERGENCY DEPARTMENT       Zachary Cruz MD  04/14/18 0616

## 2018-04-14 NOTE — PLAN OF CARE
Problem: Patient Care Overview  Goal: Plan of Care/Patient Progress Review  Outcome: Improving  PRIMARY DIAGNOSIS: ACUTE PAIN  OUTPATIENT/OBSERVATION GOALS TO BE MET BEFORE DISCHARGE:  1. Pain Status: Improved-controlled with oral pain medications.    2. Return to near baseline physical activity: Yes    3. Cleared for discharge by consultants (if involved): No    Discharge Planner Nurse   Safe discharge environment identified: Yes  Barriers to discharge: Yes       Entered by: Caden Lees 04/14/2018 11:02 AM     RN - Vitals stable. O2 saturation slightly low - pt denying SOB. C/o pain at left rib cage - pain relieved with PO ibuprofen and oxycodone. Tolerating PO intake. Encouraging IS use. Will attempt ambulation later this morning.     Please review provider order for any additional goals.   Nurse to notify provider when observation goals have been met and patient is ready for discharge.

## 2018-04-15 VITALS
HEART RATE: 71 BPM | WEIGHT: 160 LBS | RESPIRATION RATE: 16 BRPM | BODY MASS INDEX: 25.11 KG/M2 | OXYGEN SATURATION: 95 % | TEMPERATURE: 97.6 F | DIASTOLIC BLOOD PRESSURE: 69 MMHG | SYSTOLIC BLOOD PRESSURE: 132 MMHG | HEIGHT: 67 IN

## 2018-04-15 PROCEDURE — G0378 HOSPITAL OBSERVATION PER HR: HCPCS

## 2018-04-15 PROCEDURE — 25000132 ZZH RX MED GY IP 250 OP 250 PS 637: Performed by: SURGERY

## 2018-04-15 RX ORDER — OXYCODONE HYDROCHLORIDE 5 MG/1
5 TABLET ORAL
Qty: 30 TABLET | Refills: 0 | Status: SHIPPED | OUTPATIENT
Start: 2018-04-15

## 2018-04-15 RX ADMIN — OXYCODONE HYDROCHLORIDE 5 MG: 5 TABLET ORAL at 00:11

## 2018-04-15 RX ADMIN — OXYCODONE HYDROCHLORIDE 5 MG: 5 TABLET ORAL at 08:10

## 2018-04-15 NOTE — PLAN OF CARE
Problem: Patient Care Overview  Goal: Plan of Care/Patient Progress Review  Outcome: Improving  PRIMARY DIAGNOSIS: ACUTE PAIN  OUTPATIENT/OBSERVATION GOALS TO BE MET BEFORE DISCHARGE:  1. Pain Status: Improved-controlled with oral pain medications.    2. Return to near baseline physical activity: Yes    3. Cleared for discharge by consultants (if involved): N/A    Discharge Planner Nurse   Safe discharge environment identified: Yes  Barriers to discharge: No       Entered by: Saloni Galarza 04/15/2018 5:21 AM     A&O, VSS. Reports 8/10 pain to L rib cage, prn oxycodone given. Encouraging IS, has productive cough. Up SBA in room. Tolerating regular diet. Plan for pt to discharge tomorrow. Will continue to monitor.         Please review provider order for any additional goals.   Nurse to notify provider when observation goals have been met and patient is ready for discharge.

## 2018-04-15 NOTE — PROGRESS NOTES
Social Work Note:    D: SW contacted by Charge RN asking about transportation for pt.    I/A: SW contacted [employer] to obtain transportation for pt. [employer] stated that they are booked out until at least an hour. Per representative, she stated that she would call SW when transportation is available. SW updated Charge RN as pt is ready to d/c and is already sitting by the main entrance.     P: Pt will be transported to [facility] (where pt stays during the week) via [employer] when a taxi is available.    Weekend SW: Russ Mishra, ROGELIO, LICSW

## 2018-04-15 NOTE — DISCHARGE INSTRUCTIONS
HOME CARE   BATSHEVA Rasmussen, JOHN Hood, JOVAN Shaw    DIET:  No restrictions.  Increased fluid intake is recommended. While taking pain medications, increase dietary fiber or add a fiber supplementation like Metamucil or Citrucel to help prevent constipation - a possible side effect of pain medications.    NAUSEA:  If nauseated from the pain meds; rest in bed, get up cautiously with assistance, and drink clear liquids (juice, tea, broth).    ACTIVITY:  Light Activity -- you may immediately be up and about as tolerated.  Driving -- you may drive when comfortable and off narcotic pain medications.  Light Work -- resume when comfortable off pain medications.  (If you can drive, you probably can work.)  Strenuous Work/Activity -- limit lifting to 20 pounds for 3 weeks.  Active Sports (running, biking, etc.) -- cautiously resume after 4 weeks.      DISCOMFORT:   Take the pain medication with some food, when possible, to minimize side effects.  Intermittent use of ice packs may help during the first 1-3 weeks.  Expect gradual improvement.    Over-the-counter anti-inflammatory medications (i.e. Ibuprofen/Advil/Motrin or Naprosyn/Aleve) may be used per package instructions in addition to or while tapering off the narcotic pain medications to decrease swelling and sensitivity at the repair site.  DO NOT TAKE these Anti-inflammatory medications if your primary physician has advised against doing so, or if you have acid reflux, ulcer, or bleeding disorder, or take blood-thinner medications.  Call your primary physician or the surgery office if you have medication questions.      FREQUENTLY ASKED QUESTIONS:    Q:  There is a piece of tape or a sticky  lead  still on my skin.  Can I remove this?  A:  Sometimes the sticky  leads  used for monitoring during surgery or for evaluation in the emergency department are not all removed while you are in the hospital.  These sometimes have a tab or metal dot on  them.  You can easily remove these on your own, like taking off a band-aid.  If there is a gel substance under the  lead , simply wipe/clean it off with a washcloth or paper towel.      Q:  What can I do to minimize constipation (very hard stools, or lack of stools)?  A:  Stay well hydrated.  Increase your dietary fiber intake or take a fiber supplement -with plenty of water.  Walk around frequently.  You may consider an over-the-counter stool-softener.  Your Pharmacist can assist you with choosing one that is stocked at your pharmacy.  Constipation is also one of the most common side effects of pain medication.  If you are using pain medication, be pro-active and try to PREVENT problems with constipation by taking the steps above BEFORE constipation becomes a problem.    Q:  What do I do if I need more pain medications?  A:  Call the office to receive refills.  Be aware that certain pain meds cannot be called into a pharmacy and actually require a paper prescription.  A change may be made in your pain med as you progress thru your recovery period or if you have side effects to certain meds.    --Pain meds are NOT refilled after 5pm on weekdays, and NOT AT ALL on the weekends, so please look ahead to prevent problems.      Q:  Why am I having a hard time sleeping now that I am at home?  A:  Many medications you receive while you are in the hospital can impact your sleep for a number of days after your surgery/hospitalization.  Decreased level of activity and naps during the day may also make sleeping at night difficult.  Try to minimize day-time naps, and get up frequently during the day to walk around your home during your recovery time.  Sleep aides may be of some help, but are not recommended for long-term use.      Q:  Why am I having headaches?  A:  Headaches can be caused by many things:  caffeine withdrawal, use of pain meds, dehydration, high blood pressure, lack of sleep, over-activity/exhaustion, flare-up  of usual migraine headaches.  If you feel this is related to muscle tension (a band-like feeling around the head, or a pressure at the low-back of the head) you may try ice or heat to this area.  You may need to drink more fluids (try electrolyte drink like Gatorade), rest, or take your usual migraine medications.   **If your headaches do not resolve, worsen, are accompanied by other symptoms, or if your blood pressure is high, please call your primary physician for recommendation and/or examination    If you have other questions, please call the office Monday thru Friday between 8am and 5pm to discuss with the nurse or physician assistant.  #[phone]    There is a surgeon ON CALL on weekday evenings and over the weekend in case of urgent need only, and may be contacted at the same number.    If you are having an emergency, call 911 or proceed to your nearest emergency department.

## 2018-04-15 NOTE — PLAN OF CARE
Problem: Patient Care Overview  Goal: Plan of Care/Patient Progress Review  PRIMARY DIAGNOSIS: ACUTE PAIN/RIB FRACTURE  OUTPATIENT/OBSERVATION GOALS TO BE MET BEFORE DISCHARGE:  1. Pain Status: Improved-controlled with oral pain medications.    2. Return to near baseline physical activity: No    3. Cleared for discharge by consultants (if involved): Yes    A&Ox4, oxycodone given for pain. Denies pain meds if pain 5/10 or less. Calls appropriately. Assist x1. Plan to DC home tomorrow.     Discharge Planner Nurse   Safe discharge environment identified: Yes  Barriers to discharge: No       Entered by: Leonor Man 04/14/2018 10:52 PM     Please review provider order for any additional goals.   Nurse to notify provider when observation goals have been met and patient is ready for discharge.

## 2018-04-15 NOTE — PLAN OF CARE
Problem: Patient Care Overview  Goal: Plan of Care/Patient Progress Review  Outcome: Improving  PRIMARY DIAGNOSIS: ACUTE PAIN  OUTPATIENT/OBSERVATION GOALS TO BE MET BEFORE DISCHARGE:  1. Pain Status: Improved-controlled with oral pain medications.    2. Return to near baseline physical activity: Yes    3. Cleared for discharge by consultants (if involved): Yes    Discharge Planner Nurse   Safe discharge environment identified: Yes  Barriers to discharge: No       Entered by: Caden Lees 04/15/2018 9:41 AM     RN - VSS. Pt pain well controlled with PO medications. PIV removed. Pt receiving d/c orders. Instructions and follow up reviewed with pt    Patient's After Visit Summary was reviewed with patient.   Patient verbalized understanding of After Visit Summary, recommended follow up and was given an opportunity to ask questions.   Discharge medications sent home with patient/family: YES - oxycodone prescription filled and given to pt.  Discharged with other:via cab ride to hotel where son is staying        OBSERVATION patient END time: 0940

## 2018-04-15 NOTE — PROGRESS NOTES
"[facility]  General Surgery Progress Note           Assessment and Plan:   Assessment:   Rib fractures      Plan:   -Discharge later today         Interval History:   Feels pain control is adequate with oral meds  Discussed constipation prevention and activity         Physical Exam:   Blood pressure 116/67, pulse 71, temperature 97.6  F (36.4  C), temperature source Oral, resp. rate 18, height 1.702 m (5' 7\"), weight 72.6 kg (160 lb), SpO2 96 %.    I/O last 3 completed shifts:  In: 250 [P.O.:250]  Out: 1225 [Urine:1225]    Tender left lower rib cage  Abdomen soft and non-tender          Data:       Joce Langston MD     "

## 2018-04-15 NOTE — PLAN OF CARE
Problem: Patient Care Overview  Goal: Plan of Care/Patient Progress Review  Outcome: Improving  PRIMARY DIAGNOSIS: ACUTE PAIN  OUTPATIENT/OBSERVATION GOALS TO BE MET BEFORE DISCHARGE:  1. Pain Status: Improved-controlled with oral pain medications.    2. Return to near baseline physical activity: Yes    3. Cleared for discharge by consultants (if involved): N/A    Discharge Planner Nurse   Safe discharge environment identified: Yes  Barriers to discharge: No       Entered by: Saloni Galarza 04/15/2018 2:07 AM     A&O, VSS. Reports 8/10 pain to L rib cage, prn oxycodone given. Encouraging IS, has productive cough. Up SBA in room. Tolerating regular diet. Plan for pt to discharge tomorrow. Will continue to monitor and provide supportive cares.         Please review provider order for any additional goals.   Nurse to notify provider when observation goals have been met and patient is ready for discharge.

## 2018-04-23 NOTE — DISCHARGE SUMMARY
[facility]    Discharge Summary  Surgery    Date of Admission:  4/14/2018  Date of Discharge:  4/15/2018 10:44 AM  Discharging Provider: Joce Langston MD  Discharge Summary Note completed by: Rukhsana Mota PA-C on 4/23/2018  Date of Service: The patient was personally seen by Discharging Providers on the day of discharge.    Discharge Diagnoses   -Trauma admission, s/p fall onto tree stump; no head impact/LOC  -Fractures, left lateral 10th and 11th ribs  -Dependant atelectasis, bilateral; present on admission  -Pain management    History of Present Illness   Shivam Mcdaniel is a 77 year old male who presented with left abdominal pain uncontrolled on OTC meds after a fall onto a tree stump 2 days prior.  Imaging revealed fractures of left lateral 10th and 11th ribs.  Patient felt improvement of pain after IV Morphine and dilaudid dosing.  Patient was registered to observation for ongoing pain management, DVT prophylaxis, bowel management, and respiratory IS use and education.      Hospital Course   Shivam Mcdaniel was admitted on 4/14/2018.  The following problems were addressed during his hospitalization:  -Trauma admission, s/p fall onto tree stump; no head impact/LOC  -Fractures, left lateral 10th and 11th ribs  -Dependant atelectasis, bilateral; present on admission  -Pain management    Pain control: was via IV until able to tolerate PO intake and transitioned to PO pain meds.    Remarkable hospital course events: Able to transition to oral ibuprofen and oxycodone pain management regimen, also used lidoderm patch for pain control.   Respiratory IS use and education completed to optimize status.  Bowel program was started and recommended for home use while on continued narcotic therapy.       Shivam met all criteria for release on 4/15/2018 10:44 AM.  He was afebrile, tolerating diet, pain controlled on PO meds, ambulating well, and had return of bowel function.    Medications  discontinued or adjusted during this hospitalization: see discharge med list below.    Antibiotics prescribed at discharge: None prescribed     Imaging study follow up needs:   -No studies require specific follow-up    Discharge Instructions and Follow-Up:  Discharge diet: Regular   Discharge activity: Lifting restricted to 10 pounds with left arm  No driving or operating machinery while on narcotic analgesics   Discharge follow-up: Follow up with primary care provider in 1 weeks if refills needed       Rukhsana Mota PA-C      Discharge Disposition   Discharged to home   Condition at discharge: Stable    Pending Results   Unresulted Labs Ordered in the Past 30 Days of this Admission     No orders found from 2/13/2018 to 4/15/2018.          Primary Care Physician   Savannah Mera    Consultations This Hospital Stay   RESPIRATORY CARE IP CONSULT    Discharge Medications   Discharge Medication List as of 4/15/2018  9:38 AM      START taking these medications    Details   oxyCODONE IR (ROXICODONE) 5 MG tablet Take 1 tablet (5 mg) by mouth every 3 hours as needed for moderate to severe pain, Disp-30 tablet, R-0, Local Print           Allergies   No Known Allergies     Data   Most Recent 3 CBC's:  Recent Labs   Lab Test  04/14/18   0119   WBC  11.8*   HGB  15.8   MCV  95   PLT  281      Most Recent 3 BMP's:  Recent Labs   Lab Test  04/14/18   0119   NA  138   POTASSIUM  4.1   CHLORIDE  106   CO2  27   BUN  18   CR  0.93   ANIONGAP  5   YASH  8.9   GLC  108*     Most Recent 2 LFT's:  Recent Labs   Lab Test  04/14/18 0119   AST  23   ALT  26   ALKPHOS  91   BILITOTAL  0.1*     Most Recent INR's and Anticoagulation Dosing History:  Anticoagulation Dose History     Recent Dosing and Labs Latest Ref Rng & Units 4/14/2018    INR 0.86 - 1.14 0.91        Results for orders placed or performed during the hospital encounter of 04/14/18   CT Chest/Abdomen/Pelvis w Contrast    Narrative    CT CHEST/ABDOMEN/PELVIS W  CONTRAST  4/14/2018 1:50 AM    HISTORY: One day status post falling on 4 inch tree stump. Evaluate  trauma.    TECHNIQUE: CT scan obtained of the chest, abdomen, and pelvis with  oral and IV contrast. 81 mL Isovue-370 injected. Radiation dose for  this scan was reduced using automated exposure control, adjustment of  the mA and/or kV according to patient size, or iterative  reconstruction technique.    COMPARISON: None.    FINDINGS:  Chest: There is dependent atelectasis bilaterally. No pneumothorax or  pleural effusion. The thoracic aorta is calcified and tortuous. There  are coronary artery atherosclerotic calcifications. The heart size is  normal. No mediastinal, hilar or axillary lymph node enlargement.    Abdomen: There are a few hepatic cysts and other tiny low attenuation  lesions. Low attenuation subcentimeter liver lesion(s) compatible with  benign cysts or other benign lesions. No specific evaluation or  follow-up is recommended in a low risk patient. The spleen,  gallbladder, pancreas, adrenal glands and left kidney are normal in  appearance. A few tiny right renal cortical lesions. 1.8 cm cyst in  the upper pole of the right kidney. Low attenuation subcentimeter  renal lesion(s). These are compatible with small benign cysts and no  specific imaging evaluation or follow-up is recommended. There is no  abdominal or pelvic lymph node enlargement. There is atherosclerotic  calcification of the aorta and its branches. No aneurysm.    Pelvis: There are sigmoid diverticula without acute diverticulitis. No  bowel obstruction or inflammation. The prostate gland is enlarged and  there is a TURP defect. There are postoperative changes in the  anterior pelvic wall. There are nondisplaced fractures of the left  lateral tenth and eleventh ribs. No other acute bone fracture.      Impression    IMPRESSION:  1. Left lateral tenth and eleventh rib fractures.  2. No other acute traumatic abnormality.  3. Coronary artery  atherosclerotic calcifications.  4. Colonic diverticula without acute diverticulitis.    RAJWINDER MADRIGAL MD

## 2020-08-09 ENCOUNTER — HOSPITAL ENCOUNTER (EMERGENCY)
Facility: CLINIC | Age: 80
Discharge: HOME | End: 2020-08-09
Attending: EMERGENCY MEDICINE | Admitting: EMERGENCY MEDICINE
Payer: COMMERCIAL

## 2020-08-09 VITALS
OXYGEN SATURATION: 98 % | HEART RATE: 85 BPM | SYSTOLIC BLOOD PRESSURE: 153 MMHG | DIASTOLIC BLOOD PRESSURE: 106 MMHG | TEMPERATURE: 97.9 F | RESPIRATION RATE: 18 BRPM

## 2020-08-09 DIAGNOSIS — L03.311 CELLULITIS OF ABDOMINAL WALL: ICD-10-CM

## 2020-08-09 DIAGNOSIS — T63.441A BEE STING REACTION, ACCIDENTAL OR UNINTENTIONAL, INITIAL ENCOUNTER: ICD-10-CM

## 2020-08-09 PROCEDURE — 99282 EMERGENCY DEPT VISIT SF MDM: CPT

## 2020-08-09 RX ORDER — DOXYCYCLINE 100 MG/1
100 CAPSULE ORAL 2 TIMES DAILY
Qty: 14 CAPSULE | Refills: 0 | Status: SHIPPED | OUTPATIENT
Start: 2020-08-09 | End: 2020-08-16

## 2020-08-09 RX ORDER — DIPHENHYDRAMINE HCL 25 MG
25 TABLET ORAL EVERY 6 HOURS PRN
Qty: 20 TABLET | Refills: 0 | Status: SHIPPED | OUTPATIENT
Start: 2020-08-09

## 2020-08-09 ASSESSMENT — ENCOUNTER SYMPTOMS
CHILLS: 0
FEVER: 0
SHORTNESS OF BREATH: 0
ROS SKIN COMMENTS: BEE STINGS

## 2020-08-09 NOTE — ED PROVIDER NOTES
History     Chief Complaint:  Insect Bite (Thursday)      HPI   Shivam Mcdaniel is a 79 year old male who presents with an insect bite. The patient reports on 8/6, he was tung by a bee 4 times on stomach and arms. He comes in for persistent pain soreness. He states he hasn't been tung by a bee in years. He denies difficulty breathing, shortness of breath, fevers, and chills. He states he has not been using Benadryl    Allergies:  Augmentin  Penicillins    Medications:    Prilosec  Albuterol inhaler    Past Medical History:    Ulcer  Hernia  Prostatitis  Esophagitis  Neuropathy    Past Surgical History:    Hernia repair (R)  Rotator cuff repair  TURP  ORIF (L)  Inguinal herniorrhaphy    Family History:    Bladder cancer (Brother)  Hypertension (Sister)    Social History:  Smoking status: Former, quit January 2018  Alcohol use: Yes  PCP: Savannah Mera  Marital Status:   [2]    Review of Systems   Constitutional: Negative for chills and fever.   Respiratory: Negative for shortness of breath.    Skin:        Bee stings   All other systems reviewed and are negative.    Physical Exam     Patient Vitals for the past 24 hrs:   BP Temp Temp src Pulse Resp SpO2   08/09/20 1344 (!) 153/106 97.9  F (36.6  C) Oral 85 18 98 %     Physical Exam  General: Patient is awake, alert and interactive when I enter the room  Head: The scalp, face, and head appear normal  Eyes: The pupils are equal, round, and reactive to light. Conjunctivae and sclerae are normal  ENT: External acoustic canals are normal. The oropharynx is normal without erythema. Uvula is in the midline. No tongue swelling   Neck: Normal range of motion. No anterior cervical lymphadenopathy noted  CV: Regular rate. S1/S2. No murmurs.   Resp: Lungs are clear without wheezes or rales. No respiratory distress.   GI: Abdomen is soft, no rigidity, guarding, or rebound. No distension. No tenderness to palpation in any quadrant.     MS: Normal tone. Joints  grossly normal without effusions. No asymmetric leg swelling, calf or thigh tenderness.    Skin: Multiple bee stings over right arm and abdominal wall.  Extremity bee stings do not appear to be significantly erythematous or tender.  However there is one area along his abdominal wall where there is some surrounding erythema and a small area that looks like it may be the stinger.  There is associated tenderness and warmth to this area.  No area of fluctuance.  Neuro: Speech is normal and fluent. Face is symmetric. Moving all extremities.   Psych:  Normal affect.  Appropriate interactions.    Emergency Department Course   Emergency Department Course:  Past medical records, nursing notes, and vitals reviewed.  1348: I performed an exam of the patient and obtained history, as documented above.    Findings and plan explained to the Patient. Patient discharged home with instructions regarding supportive care, medications, and reasons to return. The importance of close follow-up was reviewed.     Impression & Plan      Medical Decision Making:  Patient is a 79-year-old male who presents emergency department after multiple bee stings.  There is 1 area in particular that is concerning for possible cellulitis.  Will cover the patient with antibiotics.  He is also having some itching related to his bee stings therefore will be prescribed Benadryl.  We discussed reasons to return to the emergency department.  All questions were answered patient be discharged home in stable condition.    Diagnosis:    ICD-10-CM    1. Bee sting reaction, accidental or unintentional, initial encounter  T63.441A    2. Cellulitis of abdominal wall  L03.311        Disposition:  discharged to home    Discharge Medications:  New Prescriptions    DIPHENHYDRAMINE (BENADRYL) 25 MG TABLET    Take 1 tablet (25 mg) by mouth every 6 hours as needed for itching or allergies    DOXYCYCLINE HYCLATE (VIBRAMYCIN) 100 MG CAPSULE    Take 1 capsule (100 mg) by mouth 2  times daily for 7 days       Noam Macdonald  8/9/2020   [facility] EMERGENCY DEPARTMENT  I, Noam Macdonald, am serving as a scribe at 1:48 PM on 8/9/2020 to document services personally performed by Zachary Zaidi MD based on my observations and the provider's statements to me.        Zachary Zaidi MD  08/09/20 182

## 2020-08-09 NOTE — ED TRIAGE NOTES
Patient stung by bee 4 times on Thursday and still bothering him. Site on stomach red and irritated. ABC's intact.

## 2020-08-09 NOTE — ED AVS SNAPSHOT
[facility]  [address]  [facility] [address]  Phone:  [phone]  Fax:  [phone]                                    Shivam Mcdaniel   MRN: [id number]    Department:  [facility]   Date of Visit:  8/9/2020           After Visit Summary Signature Page    I have received my discharge instructions, and my questions have been answered. I have discussed any challenges I see with this plan with the nurse or doctor.    ..........................................................................................................................................  Patient/Patient Representative Signature      ..........................................................................................................................................  Patient Representative Print Name and Relationship to Patient    ..................................................               ................................................  Date                                   Time    ..........................................................................................................................................  Reviewed by Signature/Title    ...................................................              ..............................................  Date                                               Time          22EPIC Rev 08/18

## 2023-06-10 ENCOUNTER — HOSPITAL ENCOUNTER (EMERGENCY)
Facility: CLINIC | Age: 83
Discharge: HOME | End: 2023-06-10
Attending: EMERGENCY MEDICINE | Admitting: EMERGENCY MEDICINE
Payer: COMMERCIAL

## 2023-06-10 VITALS
TEMPERATURE: 97.4 F | SYSTOLIC BLOOD PRESSURE: 133 MMHG | OXYGEN SATURATION: 96 % | HEART RATE: 68 BPM | RESPIRATION RATE: 20 BRPM | DIASTOLIC BLOOD PRESSURE: 79 MMHG

## 2023-06-10 DIAGNOSIS — L02.612: ICD-10-CM

## 2023-06-10 PROCEDURE — 10060 I&D ABSCESS SIMPLE/SINGLE: CPT

## 2023-06-10 PROCEDURE — 99283 EMERGENCY DEPT VISIT LOW MDM: CPT | Mod: 25

## 2023-06-10 RX ORDER — CEFADROXIL 500 MG/1
500 CAPSULE ORAL 2 TIMES DAILY
Qty: 14 CAPSULE | Refills: 0 | Status: SHIPPED | OUTPATIENT
Start: 2023-06-10 | End: 2023-06-17

## 2023-06-10 NOTE — ED TRIAGE NOTES
Pt arrives with c/o tick to left foot, near big toe. Upon assessment, tick is not easily visualized, pt does have red spot to 3rd toe with round darker spot underneath the skin.     Triage Assessment       Row Name 06/10/23 0949       Triage Assessment (Adult)    Airway WDL WDL       Respiratory WDL    Respiratory WDL WDL       Skin Circulation/Temperature WDL    Skin Circulation/Temperature WDL WDL       Cardiac WDL    Cardiac WDL WDL       Peripheral/Neurovascular WDL    Peripheral Neurovascular WDL WDL       Cognitive/Neuro/Behavioral WDL    Cognitive/Neuro/Behavioral WDL WDL

## 2023-06-10 NOTE — DISCHARGE INSTRUCTIONS
- Change the bandage twice daily. Once in the morning and once in the evening or more often if it gets wet or dirty. Soak the left foot twice a day in warm soapy water prior to putting on a new bandage. Apply antibiotic ointment to the wound at each bandage change.

## 2023-06-11 NOTE — ED PROVIDER NOTES
"  History     Chief Complaint:  Tick Removal       HPI   Shivam Mcdaniel is a 82 year old male who presents to the ED for evaluation of pain and redness to the left 3rd toe.  The patient reports that yesterday his left third toe began bothering him and when he took off the boot and sock on his left foot today there was an area of redness with a central \"black spot\" on the dorsum of the third toe.  The patient is concerned that there \"is a tick in there\".  He denies any injury or trauma to the area.  No other rash or lesions.  He denies any fevers.  He otherwise is feeling well without malaise.  He denies any history of diabetes.  He denies any other symptoms at this time.      Independent Historian:   None - Patient Only    Review of External Notes:  None    ROS:  See HPI    Allergies:  Amoxicillin-Pot Clavulanate  Penicillins     Physical Exam   Patient Vitals for the past 24 hrs:   BP Temp Temp src Pulse Resp SpO2   06/10/23 0950 133/79 97.4  F (36.3  C) Temporal 68 20 96 %        Physical Exam  General: Resting comfortably. Well appearing, nontoxic.   Head:  Scalp, face, and head appear normal  Eyes:  Pupils equal, round    Conjunctivae noninjected and sclera white  ENT:    The nose is normal    Ears/pinnae are normal  Neck:  Normal range of motion  CV:  Left foot warm and well perfused. Left DP pulse 2+  MSK:  No bony tenderness to palpation to the left toes or foot. There is chronic hammertoe deformities of the second and third toes.  Skin:  Small 1 cm round area of erythema to the dorsum of the left third toe with a central pustule.  Neuro:  Speech is normal and fluent    Moves all extremities spontaneously  Psych: Awake, Alert. Normal affect      Appropriate interactions           Emergency Department Course   No results found for this or any previous visit.        Laboratory:  Labs Ordered and Resulted from Time of ED Arrival to Time of ED Departure - No data to display     Procedures     Incision and " "Drainage     Procedure: Incision and Drainage     Consent: Verbal    Indication: Abscess    Location: Dorsum of left 3rd toe    Size: 1 cm    Ultrasound Guidance: No    Preparation: Povidone-iodine     Anesthesia/Sedation: Lidocaine - 1%     Procedure Detail:    Aspiration: No  Incision Type: The pustule was unroofed with an 18G needle  Scalpel: None  Lesion Management: Debrided   Wound Management: Left open   Packing: None     Patient Status: The patient tolerated the procedure well: Yes. There were no complications.      Emergency Department Course & Assessments:             Interventions:  Medications   lido-EPINEPHrine-tetracaine (LET) topical gel GEL ( Topical Not Given 6/10/23 1100)        Assessments, Independent Interpretation, Consult/Discussion of ManagementTests:  ED Course as of 06/10/23 2038   Sat Jones 10, 2023   1039 I&D procedure performed.       Social Determinants of Health affecting care:  None    Disposition:  The patient was discharged to home.     Impression & Plan      Medical Decision Making:  Shivam Mcdaniel is a 82 year old male who presents to the ED for evaluation of lesion and pain to the left 3rd toe.  The patient is very concerned that there is a tick \"in there\".  On my evaluation the lesion appears to be a simple pustule with some mild surrounding erythema.  I see no evidence of a  tick.  There is no erythema migrans.  I&D was performed using an 18-gauge needle to unroofed the pustule.  Bacitracin and a bandage was then applied.  There is some early surrounding cellulitis.  Therefore patient will be placed on cefadroxil.  I instructed the patient to soak the foot in warm water twice daily and to apply antibiotic ointment and clean bandage 2 times a day.  He should monitor closely for any worsening.  I stressed the importance of making sure that this area does not rub on his foot wear.  The patient is agreeable with the plan of care.  Close return precautions are provided and he was " discharged in stable condition.      Diagnosis:    ICD-10-CM    1. Abscess of fourth toe of left foot  L02.612            Discharge Medications:  Discharge Medication List as of 6/10/2023 10:56 AM      START taking these medications    Details   cefadroxil (DURICEF) 500 MG capsule Take 1 capsule (500 mg) by mouth 2 times daily for 7 days, Disp-14 capsule, R-0, Local Print                6/10/2023   No att. providers found       Historical Data:  ______________________________________________________________________  Medications:    cefadroxil (DURICEF) 500 MG capsule  diphenhydrAMINE (BENADRYL) 25 MG tablet  oxyCODONE IR (ROXICODONE) 5 MG tablet        Past Medical History:   Past Surgical History:     No past medical history on file. No past surgical history on file.   Patient Active Problem List    Diagnosis Date Noted     Fracture, ribs 04/14/2018     Priority: Medium          Family History:    family history is not on file. Social History:   reports that he has been smoking. He has never used smokeless tobacco. He reports current alcohol use.     PCP: No Ref-Primary, Physician           Iker Petersen MD  06/10/23 2045

## 2024-07-18 ENCOUNTER — HOSPITAL ENCOUNTER (EMERGENCY)
Facility: CLINIC | Age: 84
Discharge: HOME | End: 2024-07-18
Attending: EMERGENCY MEDICINE | Admitting: EMERGENCY MEDICINE
Payer: COMMERCIAL

## 2024-07-18 VITALS
OXYGEN SATURATION: 100 % | SYSTOLIC BLOOD PRESSURE: 168 MMHG | WEIGHT: 160 LBS | DIASTOLIC BLOOD PRESSURE: 85 MMHG | TEMPERATURE: 97.6 F | BODY MASS INDEX: 25.06 KG/M2 | RESPIRATION RATE: 18 BRPM | HEART RATE: 71 BPM

## 2024-07-18 DIAGNOSIS — T14.8XXA BITES: ICD-10-CM

## 2024-07-18 LAB
ATRIAL RATE - MUSE: 63 BPM
DIASTOLIC BLOOD PRESSURE - MUSE: NORMAL MMHG
INTERPRETATION ECG - MUSE: NORMAL
P AXIS - MUSE: 39 DEGREES
PR INTERVAL - MUSE: 168 MS
QRS DURATION - MUSE: 78 MS
QT - MUSE: 400 MS
QTC - MUSE: 409 MS
R AXIS - MUSE: -27 DEGREES
SYSTOLIC BLOOD PRESSURE - MUSE: NORMAL MMHG
T AXIS - MUSE: 2 DEGREES
VENTRICULAR RATE- MUSE: 63 BPM

## 2024-07-18 PROCEDURE — 93005 ELECTROCARDIOGRAM TRACING: CPT | Mod: RTG

## 2024-07-18 PROCEDURE — 99283 EMERGENCY DEPT VISIT LOW MDM: CPT

## 2024-07-18 RX ORDER — DIPHENHYDRAMINE HCL 25 MG
25 TABLET ORAL EVERY 6 HOURS PRN
Qty: 12 TABLET | Refills: 0 | Status: SHIPPED | OUTPATIENT
Start: 2024-07-18

## 2024-07-18 ASSESSMENT — COLUMBIA-SUICIDE SEVERITY RATING SCALE - C-SSRS
1. IN THE PAST MONTH, HAVE YOU WISHED YOU WERE DEAD OR WISHED YOU COULD GO TO SLEEP AND NOT WAKE UP?: NO
2. HAVE YOU ACTUALLY HAD ANY THOUGHTS OF KILLING YOURSELF IN THE PAST MONTH?: NO
6. HAVE YOU EVER DONE ANYTHING, STARTED TO DO ANYTHING, OR PREPARED TO DO ANYTHING TO END YOUR LIFE?: NO

## 2024-07-18 ASSESSMENT — ACTIVITIES OF DAILY LIVING (ADL)
ADLS_ACUITY_SCORE: 36
ADLS_ACUITY_SCORE: 34

## 2024-07-18 NOTE — ED TRIAGE NOTES
Pt arrives with c/o bed bugs that have been ongoing for 3 weeks. Pt was seen elsewhere weeks ago and given a cream but he is out of the medicated cream and still feeling symptoms. He is now feeling chills at home and had midsternal and anterior L chest pain today which he believes is associated to the bed bugs. A&Ox4, hypertensive, all other VSS     Triage Assessment (Adult)       Row Name 07/18/24 0235          Triage Assessment    Airway WDL WDL        Respiratory WDL    Respiratory WDL WDL        Skin Circulation/Temperature WDL    Skin Circulation/Temperature WDL WDL        Cardiac WDL    Cardiac WDL chest pain;X        Chest Pain Assessment    Chest Pain Location midsternal;anterior chest, left        Peripheral/Neurovascular WDL    Peripheral Neurovascular WDL WDL        Cognitive/Neuro/Behavioral WDL    Cognitive/Neuro/Behavioral WDL WDL

## 2024-07-18 NOTE — DISCHARGE INSTRUCTIONS
Concern for bed bugs today. Please discontinue mupirocin but continue triamcinolone cream. This can take weeks to resolve.  Monitor for fever, increasing redness to area, purulent drainage. I have provided benadryl to assist with itching sensation.

## 2024-07-18 NOTE — ED PROVIDER NOTES
"  Emergency Department Note      History of Present Illness     Chief Complaint   Insect Bite and Chest Pain      HPI   Shivam Mcdaniel is a 83 year old male presenting with concerns for insect bites.  Patient reports roughly 2-3 weeks ago staying at a motel and was \"bit by something.\"  He reports developing lesions to his abdomen and lower legs.  These lesions are pruritic.  He went to [facility] on 7/3/24 and was diagnosed with concern for bed bugs and was given mupirocin in addition to triamcinolone cream for symptom management.  Patient presents today frustrated that lesions are not improving and is out of the mupirocin cream.  His wife reportedly had concerns prompting presentation. He reports using \"salt on lesions\" to ensure no developing infection. He denies fever, chills, chest pain, dyspnea, abdominal pain or other symptoms. Patient states still staying at the motel though has asked to change rooms.  He reports washing all his clothes in warm water.  No new medications, detergents or other new exposures.     Independent Historian   None    Review of External Notes   [facility] note 7/3/24    Past Medical History     Medical History and Problem List   No past medical history on file.    Medications   diphenhydrAMINE (BENADRYL) 25 MG tablet  oxyCODONE IR (ROXICODONE) 5 MG tablet        Surgical History   No past surgical history on file.    Physical Exam     Patient Vitals for the past 24 hrs:   BP Temp Temp src Pulse Resp SpO2 Weight   07/18/24 0234 (!) 174/91 97.6  F (36.4  C) Temporal 65 18 99 % 72.6 kg (160 lb)     Physical Exam  Nursing note and vitals reviewed.  Constitutional: Well nourished.   Eyes: Conjunctiva normal.  Pupils are equal, round, and reactive to light.   ENT: Nose normal. Mucous membranes pink and moist.    Neck: Normal range of motion.  CVS: Normal rate, regular rhythm.  Normal heart sounds.    Pulmonary: Lungs clear to auscultation bilaterally. No " "wheezes/rales/rhonchi.  GI: Abdomen soft. Nontender, nondistended. No rigidity or guarding.    MSK: No calf tenderness or swelling.  Neuro: Alert. Follows simple commands.  Skin: Skin is warm and dry. Raised erythematous bumps in linear formation throughout body, predominately to legs but a few to torso and chest wall, no erythema/warmth/purulent drainage. No vesicles. No palm/sole involvement. No oral lesions  Psychiatric: Normal affect.       Diagnostics     Lab Results   Labs Ordered and Resulted from Time of ED Arrival to Time of ED Departure - No data to display    Imaging   No orders to display       EKG   ECG taken at 0257, ECG read at 0308  Normal sinus rhythm    Rate 63 bpm. CA interval 168 ms. QRS duration 78 ms. QT/QTc 400/409 ms. P-R-T axes 39 -27 2.    Independent Interpretation   None    ED Course      Medications Administered   Medications - No data to display    Procedures   Procedures     Discussion of Management   None    ED Course        Optional/Additional Documentation  None    Medical Decision Making / Diagnosis     CMS Diagnoses: None    MIPS       None    CLARITA Smithald POOL Violeta is a 83 year old male presenting with concern for ongoing rash reportedly attributed to bed bugs. On exam, nontoxic. Rash does not appear to be life threatening nor evidence to suggest overlying bacterial infection. Agree that rash most consistent with bed bugs. I discussed I do not feel patient requires mupirocin though to continue triamcinolone for symptom relief. Also provided short course benadryl PRN. We discussed that bed bugs unfortunately can be difficult to control and may still be infested in current living situation. He expressed frustration at \"nothing is helping\" and requested dermatology referral which I provided today. Triage note reported chills, chest pain though patient denies any infectious symptoms, chest pain or other worrisome symptoms to me. Screening EKG reassuring.  All questions addressed. " I offered to discuss case with wife over the phone though he declined.  Return precautions given.     Disposition   The patient was discharged.     Diagnosis     ICD-10-CM    1. Bites  T14.8XXA     concern for bed bugs           Discharge Medications   Discharge Medication List as of 7/18/2024  4:17 AM        START taking these medications    Details   !! diphenhydrAMINE (BENADRYL) 25 MG tablet Take 1 tablet (25 mg) by mouth every 6 hours as needed for itching, Disp-12 tablet, R-0, Local Print       !! - Potential duplicate medications found. Please discuss with provider.            DO Danielle Munoz Lindsey E, DO  07/18/24 6962

## (undated) RX ORDER — LIDOCAINE HYDROCHLORIDE 10 MG/ML
INJECTION, SOLUTION EPIDURAL; INFILTRATION; INTRACAUDAL; PERINEURAL
Status: DISPENSED
Start: 2023-06-10